# Patient Record
Sex: MALE | Race: WHITE | Employment: FULL TIME | URBAN - METROPOLITAN AREA
[De-identification: names, ages, dates, MRNs, and addresses within clinical notes are randomized per-mention and may not be internally consistent; named-entity substitution may affect disease eponyms.]

---

## 2017-03-02 ENCOUNTER — OFFICE VISIT (OUTPATIENT)
Dept: FAMILY MEDICINE CLINIC | Age: 47
End: 2017-03-02

## 2017-03-02 ENCOUNTER — HOSPITAL ENCOUNTER (OUTPATIENT)
Dept: LAB | Age: 47
Discharge: HOME | End: 2017-03-02
Payer: COMMERCIAL

## 2017-03-02 VITALS
RESPIRATION RATE: 18 BRPM | TEMPERATURE: 97.6 F | DIASTOLIC BLOOD PRESSURE: 86 MMHG | BODY MASS INDEX: 27.83 KG/M2 | HEART RATE: 77 BPM | SYSTOLIC BLOOD PRESSURE: 128 MMHG | HEIGHT: 73 IN | OXYGEN SATURATION: 97 % | WEIGHT: 210 LBS

## 2017-03-02 DIAGNOSIS — Z91.09 ENVIRONMENTAL ALLERGIES: ICD-10-CM

## 2017-03-02 DIAGNOSIS — L21.9 SEBORRHEIC DERMATITIS: ICD-10-CM

## 2017-03-02 DIAGNOSIS — Z86.39 HISTORY OF HYPERLIPIDEMIA: ICD-10-CM

## 2017-03-02 DIAGNOSIS — R13.10 DYSPHAGIA, UNSPECIFIED TYPE: Primary | ICD-10-CM

## 2017-03-02 DIAGNOSIS — K21.9 GASTROESOPHAGEAL REFLUX DISEASE, ESOPHAGITIS PRESENCE NOT SPECIFIED: ICD-10-CM

## 2017-03-02 LAB
ALBUMIN SERPL BCP-MCNC: 4.1 G/DL (ref 3.4–5)
ALBUMIN/GLOB SERPL: 1.2 {RATIO} (ref 0.8–1.7)
ALP SERPL-CCNC: 82 U/L (ref 45–117)
ALT SERPL-CCNC: 58 U/L (ref 16–61)
ANION GAP BLD CALC-SCNC: 7 MMOL/L (ref 3–18)
AST SERPL W P-5'-P-CCNC: 32 U/L (ref 15–37)
BILIRUB SERPL-MCNC: 0.4 MG/DL (ref 0.2–1)
BUN SERPL-MCNC: 18 MG/DL (ref 7–18)
BUN/CREAT SERPL: 17 (ref 12–20)
CALCIUM SERPL-MCNC: 9 MG/DL (ref 8.5–10.1)
CHLORIDE SERPL-SCNC: 106 MMOL/L (ref 100–108)
CHOLEST SERPL-MCNC: 253 MG/DL
CO2 SERPL-SCNC: 27 MMOL/L (ref 21–32)
CREAT SERPL-MCNC: 1.07 MG/DL (ref 0.6–1.3)
GLOBULIN SER CALC-MCNC: 3.3 G/DL (ref 2–4)
GLUCOSE SERPL-MCNC: 94 MG/DL (ref 74–99)
HDLC SERPL-MCNC: 34 MG/DL (ref 40–60)
HDLC SERPL: 7.4 {RATIO} (ref 0–5)
LDLC SERPL CALC-MCNC: 168.2 MG/DL (ref 0–100)
LIPID PROFILE,FLP: ABNORMAL
POTASSIUM SERPL-SCNC: 4.3 MMOL/L (ref 3.5–5.5)
PROT SERPL-MCNC: 7.4 G/DL (ref 6.4–8.2)
SODIUM SERPL-SCNC: 140 MMOL/L (ref 136–145)
TRIGL SERPL-MCNC: 254 MG/DL (ref ?–150)
VLDLC SERPL CALC-MCNC: 50.8 MG/DL

## 2017-03-02 PROCEDURE — 80053 COMPREHEN METABOLIC PANEL: CPT | Performed by: FAMILY MEDICINE

## 2017-03-02 PROCEDURE — 80061 LIPID PANEL: CPT | Performed by: FAMILY MEDICINE

## 2017-03-02 RX ORDER — TACROLIMUS 1 MG/G
OINTMENT TOPICAL 2 TIMES DAILY
Qty: 60 G | Refills: 0 | Status: SHIPPED | OUTPATIENT
Start: 2017-03-02 | End: 2018-01-02 | Stop reason: SDUPTHER

## 2017-03-02 RX ORDER — OMEPRAZOLE 20 MG/1
CAPSULE, DELAYED RELEASE ORAL
Qty: 90 CAP | Refills: 1 | Status: SHIPPED | OUTPATIENT
Start: 2017-03-02 | End: 2017-11-19 | Stop reason: SDUPTHER

## 2017-03-02 NOTE — PROGRESS NOTES
Natividad Sharma is a 55 y.o. male here to talk about possible Heredia's esophagus, family hx of it, hx of GERD, on Omeprazole.

## 2017-03-02 NOTE — PROGRESS NOTES
GERD        HPI: Scar Duron is a 55 y.o. male [place]   Here with history of GERD he had previously been using prilosec intermittently. Has to use prilosec daily now to alleviate symptoms of heartburn. Over the last 2 weeks he has had feeling of food getting stuck in esophagus requiring that he wash it down with water. His mother had hx of Heredia's esophagus. Denies any weight loss. Denies any black colored stools. He has had some blood in stools but thinks this is due to anal fissuring that he has. He does have history of hyperlipidemia but has not had lipids checked in a few years was previously on lovastatin for this problem. He is starting to engage in regular exercise program with his wife. Past Medical History:   Diagnosis Date    Contact dermatitis and other eczema, due to unspecified cause     GERD (gastroesophageal reflux disease)     Hypercholesterolemia     Testicular pain        Current Outpatient Prescriptions   Medication Sig    tacrolimus (PROTOPIC) 0.1 % ointment Apply  to affected area two (2) times a day.  omeprazole (PRILOSEC) 20 mg capsule BID for 2 weeks, then 1 daily for a week then discontinue    ketoconazole (NIZORAL) 2 % topical cream Apply  to affected area two (2) times a day. No current facility-administered medications for this visit. No Known Allergies    Past Surgical History:   Procedure Laterality Date    HX WISDOM TEETH EXTRACTION         Family History   Problem Relation Age of Onset    COPD Mother     Cancer Maternal Aunt        Social History     Social History    Marital status:      Spouse name: N/A    Number of children: N/A    Years of education: N/A     Occupational History    Not on file.      Social History Main Topics    Smoking status: Former Smoker     Packs/day: 0.50     Years: 0.50     Types: Cigarettes     Quit date: 1/1/1996    Smokeless tobacco: Former User     Quit date: 9/3/2012    Alcohol use 0.0 oz/week 0 Standard drinks or equivalent per week      Comment: occasionally    Drug use: No    Sexual activity: Not on file     Other Topics Concern    Not on file     Social History Narrative       Gen- No weight loss, No Malaise, No fatigue  Eyes- No dipoplia, blurry vision  Nose- Chronic nasal congestion, postnasal drip  CVS- No Chest pain, no palpitations, no edema  Resp- No Cough, SOB, LIMON, Wheezing  Neuro- No headaches  Skin- No easy bruising, No rashes        Visit Vitals    /86 (BP 1 Location: Right arm, BP Patient Position: Sitting)    Pulse 77    Temp 97.6 °F (36.4 °C) (Oral)    Resp 18    Ht 6' 1\" (1.854 m)    Wt 210 lb (95.3 kg)    SpO2 97%    BMI 27.71 kg/m2       Physical Exam   Constitutional: He is oriented to person, place, and time. He appears well-developed and well-nourished. No distress. HENT:   Head: Normocephalic and atraumatic. Right Ear: External ear normal.   Left Ear: External ear normal.   Nose: Mucosal edema and rhinorrhea present. Mouth/Throat: Posterior oropharyngeal erythema (mild) present. Excess mucus in posterior oropharynx     Cardiovascular: Normal rate, regular rhythm and normal heart sounds. No murmur heard. Pulmonary/Chest: Effort normal and breath sounds normal. No respiratory distress. He has no wheezes. He has no rales. Abdominal: Soft. Bowel sounds are normal. He exhibits no distension and no mass. There is no tenderness. There is no rebound and no guarding. Neurological: He is alert and oriented to person, place, and time. No cranial nerve deficit. Gait normal.   Skin: Skin is warm and dry. He is not diaphoretic. Psychiatric: He has a normal mood and affect. Nursing note and vitals reviewed. Assesment:  1.  Dysphagia, unspecified type    - REFERRAL TO GASTROENTEROLOGY    2. Gastroesophageal reflux disease, esophagitis presence not specified    - omeprazole (PRILOSEC) 20 mg capsule; BID for 2 weeks, then 1 daily for a week then discontinue  Dispense: 90 Cap; Refill: 1  - REFERRAL TO GASTROENTEROLOGY    3. History of hyperlipidemia    - METABOLIC PANEL, COMPREHENSIVE; Future  - LIPID PANEL; Future    4. Seborrheic dermatitis    - tacrolimus (PROTOPIC) 0.1 % ointment; Apply  to affected area two (2) times a day. Dispense: 60 g; Refill: 0    5. Environmental allergies  Recommended daily antihistamine use      I have discussed the diagnosis with the patient and the intended management  The patient has received an after-visit summary and questions were answered concerning future plans. I have discussed medication usage, side effects and warnings with the patient as well. I have reviewed the plan of care with the patient, accepted their input and they are in agreement with the treatment goals. Follow-up Disposition:  Return in about 1 year (around 3/2/2018) for pending labs.       Carmen Soliman MD                .

## 2017-03-03 PROBLEM — L21.9 SEBORRHEIC DERMATITIS: Status: ACTIVE | Noted: 2017-03-03

## 2017-03-03 PROBLEM — K21.9 GASTROESOPHAGEAL REFLUX DISEASE: Status: ACTIVE | Noted: 2017-03-03

## 2017-03-06 ENCOUNTER — TELEPHONE (OUTPATIENT)
Dept: FAMILY MEDICINE CLINIC | Age: 47
End: 2017-03-06

## 2017-03-09 ENCOUNTER — OFFICE VISIT (OUTPATIENT)
Dept: FAMILY MEDICINE CLINIC | Age: 47
End: 2017-03-09

## 2017-03-09 VITALS
HEART RATE: 65 BPM | TEMPERATURE: 97.3 F | HEIGHT: 73 IN | RESPIRATION RATE: 18 BRPM | DIASTOLIC BLOOD PRESSURE: 84 MMHG | BODY MASS INDEX: 27.7 KG/M2 | WEIGHT: 209 LBS | OXYGEN SATURATION: 97 % | SYSTOLIC BLOOD PRESSURE: 127 MMHG

## 2017-03-09 DIAGNOSIS — E78.00 PURE HYPERCHOLESTEROLEMIA: Primary | ICD-10-CM

## 2017-03-09 NOTE — PATIENT INSTRUCTIONS
Please engage in regular exercise. Moderate intensity aerobic exercise  (60-70% maximal heart rate) for at least 150 minutes per week spread over a minimum of 3 days. Do not take more than 2 days off from exercising. Activities such as brisk walking, jogging, bicycling and swimming. Please also engage in weight resistance exercises for at least 20 minutes twice a week. Also a high fiber diet of 30 grams daily. Hyperlipidemia: After Your Visit  Your Care Instructions  Hyperlipidemia is too much fat in your blood. The body has several kinds of fat, including cholesterol and triglycerides. Your body needs fat for many things, such as making new cells. But too much fat in your blood increases your chances of having a heart attack or stroke. You may be able to lower your cholesterol and triglycerides with a heart-healthy diet, exercise, and if needed, medicine. Your doctor may want you to try lifestyle changes first to see whether they lower the fat in your blood. You may need to take medicine if lifestyle changes do not lower the fat in your blood enough. Follow-up care is a key part of your treatment and safety. Be sure to make and go to all appointments, and call your doctor if you are having problems. Its also a good idea to know your test results and keep a list of the medicines you take. How can you care for yourself at home? Take your medicines  · Take your medicines exactly as prescribed. Call your doctor if you think you are having a problem with your medicine. · If you take medicine to lower your cholesterol, go to follow-up visits. You will need to have blood tests. · Do not take large doses of niacin, which is a B vitamin, while taking medicine called statins. It may increase the chance of muscle pain and liver problems. · Talk to your doctor about avoiding grapefruit juice if you are taking statins. Grapefruit juice can raise the level of this medicine in your blood.  This could increase side effects. Eat more fruits, vegetables, and fiber  · Fruits and vegetables have lots of nutrients that help protect against heart disease, and they have little--if any--fat. Try to eat at least five servings a day. Dark green, deep orange, or yellow fruits and vegetables are healthy choices. · Keep carrots, celery, and other veggies handy for snacks. Buy fruit that is in season and store it where you can see it so that you will be tempted to eat it. Cook dishes that have a lot of veggies in them, such as stir-fries and soups. · Foods high in fiber may reduce your cholesterol and provide important vitamins and minerals. High-fiber foods include whole-grain cereals and breads, oatmeal, beans, brown rice, citrus fruits, and apples. · Buy whole-grain breads and cereals instead of white bread and pastries. Limit saturated fat  · Read food labels and try to avoid saturated fat and trans fat. They increase your risk of heart disease. · Use olive or canola oil when you cook. Try cholesterol-lowering spreads, such as Benecol or Take Control. · Bake, broil, grill, or steam foods instead of frying them. · Limit the amount of high-fat meats you eat, including hot dogs and sausages. Cut out all visible fat when you prepare meat. · Eat fish, skinless poultry, and soy products such as tofu instead of high-fat meats. Soybeans may be especially good for your heart. Eat at least two servings of fish a week. Certain fish, such as salmon, contain omega-3 fatty acids, which may help reduce your risk of heart attack. · Choose low-fat or fat-free milk and dairy products. Get exercise, limit alcohol, and quit smoking  · Get more exercise. Work with your doctor to set up an exercise program. Even if you can do only a small amount, exercise will help you get stronger, have more energy, and manage your weight and your stress. Walking is an easy way to get exercise. Gradually increase the amount you walk every day.  Aim for at least 30 minutes on most days of the week. You also may want to swim, bike, or do other activities. · Limit alcohol to no more than 2 drinks a day for men and 1 drink a day for women. · Do not smoke. If you need help quitting, talk to your doctor about stop-smoking programs and medicines. These can increase your chances of quitting for good. When should you call for help? Call 911 anytime you think you may need emergency care. For example, call if:  · You have symptoms of a heart attack. These may include:  ¨ Chest pain or pressure, or a strange feeling in the chest.  ¨ Sweating. ¨ Shortness of breath. ¨ Nausea or vomiting. ¨ Pain, pressure, or a strange feeling in the back, neck, jaw, or upper belly or in one or both shoulders or arms. ¨ Lightheadedness or sudden weakness. ¨ A fast or irregular heartbeat. After you call 911, the  may tell you to chew 1 adult-strength or 2 to 4 low-dose aspirin. Wait for an ambulance. Do not try to drive yourself. · You have signs of a stroke. These may include:  ¨ Sudden numbness, paralysis, or weakness in your face, arm, or leg, especially on only one side of your body. ¨ New problems with walking or balance. ¨ Sudden vision changes. ¨ Drooling or slurred speech. ¨ New problems speaking or understanding simple statements, or feeling confused. ¨ A sudden, severe headache that is different from past headaches. · You passed out (lost consciousness). Call your doctor now or seek immediate medical care if:  · You have muscle pain or weakness. Watch closely for changes in your health, and be sure to contact your doctor if:  · You are very tired. · You have an upset stomach, gas, constipation, or belly pain or cramps. Where can you learn more? Go to Fanzter.be  Enter C406 in the search box to learn more about \"Hyperlipidemia: After Your Visit. \"   © 9321-2033 [employer].  Care instructions adapted under license by [employer] (which disclaims liability or warranty for this information). This care instruction is for use with your licensed healthcare professional. If you have questions about a medical condition or this instruction, always ask your healthcare professional. [address] any warranty or liability for your use of this information.   Content Version: 7.7.520054; Last Revised: October 13, 2011

## 2017-03-09 NOTE — PROGRESS NOTES
Cholesterol Problem        HPI: Jayshree Salomon is a 55 y.o. male [place]   Here in follow up of his recent lab testing. Lipids are elevated and his 10 year cardiac risk is 5.3% based on current risks. He has been elevated lipids. He has no history CAD, DM or HTN. No significant family hx of CAD. Past Medical History:   Diagnosis Date    Contact dermatitis and other eczema, due to unspecified cause     GERD (gastroesophageal reflux disease)     Hypercholesterolemia     Testicular pain        Current Outpatient Prescriptions   Medication Sig    omeprazole (PRILOSEC) 20 mg capsule BID for 2 weeks, then 1 daily for a week then discontinue    tacrolimus (PROTOPIC) 0.1 % ointment Apply  to affected area two (2) times a day.  ketoconazole (NIZORAL) 2 % topical cream Apply  to affected area two (2) times a day. No current facility-administered medications for this visit. No Known Allergies    Past Surgical History:   Procedure Laterality Date    HX WISDOM TEETH EXTRACTION         Family History   Problem Relation Age of Onset    COPD Mother     Cancer Maternal Aunt        Social History     Social History    Marital status:      Spouse name: N/A    Number of children: N/A    Years of education: N/A     Occupational History    Not on file.      Social History Main Topics    Smoking status: Former Smoker     Packs/day: 0.50     Years: 0.50     Types: Cigarettes     Quit date: 1/1/1996    Smokeless tobacco: Former User     Quit date: 9/3/2012    Alcohol use 0.0 oz/week     0 Standard drinks or equivalent per week      Comment: occasionally    Drug use: No    Sexual activity: Not on file     Other Topics Concern    Not on file     Social History Narrative       ROS    Visit Vitals    /84 (BP 1 Location: Right arm, BP Patient Position: Sitting)    Pulse 65    Temp 97.3 °F (36.3 °C) (Oral)    Resp 18    Ht 6' 1\" (1.854 m)    Wt 209 lb (94.8 kg)    SpO2 97%    BMI 27.57 kg/m2       Physical Exam   Constitutional: He appears well-developed and well-nourished. No distress. HENT:   Head: Normocephalic and atraumatic. Right Ear: External ear normal.   Left Ear: External ear normal.   Skin: He is not diaphoretic. Psychiatric: He has a normal mood and affect. Vitals reviewed. Lab Results   Component Value Date/Time    Cholesterol, total 253 03/02/2017 10:25 AM    HDL Cholesterol 34 03/02/2017 10:25 AM    LDL, calculated 168.2 03/02/2017 10:25 AM    VLDL, calculated 50.8 03/02/2017 10:25 AM    Triglyceride 254 03/02/2017 10:25 AM    CHOL/HDL Ratio 7.4 03/02/2017 10:25 AM           Assesment:  1. Pure hypercholesterolemia  Discussed current risk stratification. Will trial diet and exercise. Recheck in 3 months  - LIPID PANEL; Future        I have discussed the diagnosis with the patient and the intended management  The patient has received an after-visit summary and questions were answered concerning future plans. I have discussed medication usage, side effects and warnings with the patient as well. I have reviewed the plan of care with the patient, accepted their input and they are in agreement with the treatment goals. Follow-up Disposition:  Return in about 3 months (around 6/9/2017) for nurse visit for fasting labs.       Sarabjit Moser MD                .

## 2017-11-19 DIAGNOSIS — K21.9 GASTROESOPHAGEAL REFLUX DISEASE, ESOPHAGITIS PRESENCE NOT SPECIFIED: ICD-10-CM

## 2017-11-20 RX ORDER — OMEPRAZOLE 20 MG/1
CAPSULE, DELAYED RELEASE ORAL
Qty: 90 CAP | Refills: 1 | Status: SHIPPED | OUTPATIENT
Start: 2017-11-20 | End: 2018-01-02 | Stop reason: SDUPTHER

## 2018-01-02 ENCOUNTER — OFFICE VISIT (OUTPATIENT)
Dept: FAMILY MEDICINE CLINIC | Age: 48
End: 2018-01-02

## 2018-01-02 VITALS
OXYGEN SATURATION: 98 % | RESPIRATION RATE: 18 BRPM | HEIGHT: 73 IN | WEIGHT: 214 LBS | BODY MASS INDEX: 28.36 KG/M2 | TEMPERATURE: 98.1 F | DIASTOLIC BLOOD PRESSURE: 85 MMHG | SYSTOLIC BLOOD PRESSURE: 122 MMHG

## 2018-01-02 DIAGNOSIS — K21.9 GASTROESOPHAGEAL REFLUX DISEASE, ESOPHAGITIS PRESENCE NOT SPECIFIED: ICD-10-CM

## 2018-01-02 DIAGNOSIS — L21.9 SEBORRHEIC DERMATITIS: ICD-10-CM

## 2018-01-02 DIAGNOSIS — S69.91XA HAND INJURY, RIGHT, INITIAL ENCOUNTER: Primary | ICD-10-CM

## 2018-01-02 DIAGNOSIS — M25.441 SWELLING OF HAND JOINT, RIGHT: ICD-10-CM

## 2018-01-02 RX ORDER — TACROLIMUS 1 MG/G
OINTMENT TOPICAL 2 TIMES DAILY
Qty: 60 G | Refills: 1 | Status: SHIPPED | OUTPATIENT
Start: 2018-01-02 | End: 2019-10-07

## 2018-01-02 RX ORDER — OMEPRAZOLE 20 MG/1
CAPSULE, DELAYED RELEASE ORAL
Qty: 90 CAP | Refills: 1 | Status: SHIPPED | OUTPATIENT
Start: 2018-01-02 | End: 2022-02-15 | Stop reason: ALTCHOICE

## 2018-01-02 RX ORDER — FLUTICASONE PROPIONATE 220 UG/1
AEROSOL, METERED RESPIRATORY (INHALATION)
COMMUNITY
End: 2019-09-04

## 2018-01-02 NOTE — PROGRESS NOTES
Priyanka Tristan is a 52 y.o. male here this afternoon with right hand injury that he got 2 weeks ago. He hit a chair during an [employer] game. He didn't go anywhere to have it checked. Hurts when he flexes and there is swelling around his top knuckle of his right hand. Learning assessment previously completed. 1. Have you been to the ER, urgent care clinic or hospitalized since your last visit? no    2. Have you seen or consulted any other health care providers outside of the [address] [place] [address] since your last visit (Include any pap smears or colon screening)? Endoscopy at gastro office. Do you have an Advanced Directive? yes    Would you like information on Advanced Directives?  no

## 2018-01-02 NOTE — PROGRESS NOTES
HISTORY OF PRESENT ILLNESS  Harsh Collins is a 52 y.o. male. HPI  Harsh Collins is a 52 y.o. male who presents to the office today for right hand pain. He comes in today with c/o right hand pain. About 2 weeks ago he punched a wooden chair while watching a football game on TV. He did not have any swelling or bruising. There was pain and he initially took Aleve. He never had this evaluated. He comes in today because he has continued swelling around his right 3rd digit knuckle. He is not doing compression, ice or elevation. Chief Complaint   Patient presents with    Hand Injury     right hand       No current outpatient prescriptions on file prior to visit. No current facility-administered medications on file prior to visit. No Known Allergies  Past Medical History:   Diagnosis Date    Contact dermatitis and other eczema, due to unspecified cause     GERD (gastroesophageal reflux disease)     Hypercholesterolemia     Testicular pain      History   Smoking Status    Former Smoker    Packs/day: 0.50    Years: 0.50    Types: Cigarettes    Quit date: 1/1/1996   Smokeless Tobacco    Former User    Quit date: 9/3/2012     History   Alcohol Use    0.0 oz/week    0 Standard drinks or equivalent per week     Comment: occasionally     Family History   Problem Relation Age of Onset    COPD Mother     Cancer Maternal Aunt      Review of Systems   Musculoskeletal: Positive for joint pain. Right hand, 3rd knuckle   Neurological: Negative for tingling and focal weakness. Visit Vitals    /85 (BP 1 Location: Left arm, BP Patient Position: Sitting)    Temp 98.1 °F (36.7 °C) (Oral)    Resp 18    Ht 6' 1\" (1.854 m)    Wt 214 lb (97.1 kg)    SpO2 98%    BMI 28.23 kg/m2     Physical Exam   Constitutional: He is oriented to person, place, and time. He appears well-developed and well-nourished. No distress.    Cardiovascular:   Pulses:       Radial pulses are 2+ on the right side Musculoskeletal:        Right hand: He exhibits bony tenderness and swelling. Normal sensation noted. Normal strength noted. Hands:  Can make a closed fist without difficulty. Neurological: He is alert and oriented to person, place, and time. Psychiatric: He has a normal mood and affect. His behavior is normal.   Nursing note and vitals reviewed. ASSESSMENT and PLAN    ICD-10-CM ICD-9-CM    1. Hand injury, right, initial encounter S69.91XA 959.4       XR HAND RT MIN 3 V   2. Swelling of hand joint, right M25.441 719.04       XR HAND RT MIN 3 V   3. Gastroesophageal reflux disease, esophagitis presence not specified K21.9 530.81 omeprazole (PRILOSEC) 20 mg capsule   4. Seborrheic dermatitis L21.9 690.10 tacrolimus (PROTOPIC) 0.1 % ointment      Xray reviewed in the office and did not show obvious fractures of right hand. Will call with final reading once available. He needs to keep his hand elevated, apply ice every 20 minutes, and take NSAIDs for pain and swelling. Reviewed medication and side effects. Patient agrees with the plan and verbalizes understanding. Follow-up Disposition:  Return if symptoms worsen or fail to improve.     Forestine Sicard, PA-C  1/2/2018

## 2018-01-02 NOTE — PATIENT INSTRUCTIONS
Hand Pain: Care Instructions  Your Care Instructions    Common causes of hand pain are overuse and injuries, such as might happen during sports or home repair projects. Everyday wear and tear, especially as you get older, also can cause hand pain. Most minor hand injuries will heal on their own, and home treatment is usually all you need to do. If you have sudden and severe pain, you may need tests and treatment. Follow-up care is a key part of your treatment and safety. Be sure to make and go to all appointments, and call your doctor if you are having problems. It's also a good idea to know your test results and keep a list of the medicines you take. How can you care for yourself at home? · Take pain medicines exactly as directed. ¨ If the doctor gave you a prescription medicine for pain, take it as prescribed. ¨ If you are not taking a prescription pain medicine, ask your doctor if you can take an over-the-counter medicine. · Rest and protect your hand. Take a break from any activity that may cause pain. · Put ice or a cold pack on your hand for 10 to 20 minutes at a time. Put a thin cloth between the ice and your skin. · Prop up the sore hand on a pillow when you ice it or anytime you sit or lie down during the next 3 days. Try to keep it above the level of your heart. This will help reduce swelling. · If your doctor recommends a sling, splint, or elastic bandage to support your hand, wear it as directed. When should you call for help? Call 911 anytime you think you may need emergency care. For example, call if:  ? · Your hand turns cool or pale or changes color. ?Call your doctor now or seek immediate medical care if:  ? · You cannot move your hand. ? · Your hand pops, moves out of its normal position, and then returns to its normal position. ? · You have signs of infection, such as:  ¨ Increased pain, swelling, warmth, or redness. ¨ Red streaks leading from the sore area.   ¨ Pus draining from a place on your hand. ¨ A fever. ? · Your hand feels numb or tingly. ? Watch closely for changes in your health, and be sure to contact your doctor if:  ? · Your hand feels unstable when you try to use it. ? · You do not get better as expected. ? · You have any new symptoms, such as swelling. ? · Bruises from an injury to your hand last longer than 2 weeks. Where can you learn more? Go to http://geetha-shiraz.info/. Enter R273 in the search box to learn more about \"Hand Pain: Care Instructions. \"  Current as of: March 20, 2017  Content Version: 11.4  © 2603-1748 [employer]. Care instructions adapted under license by [employer] (which disclaims liability or warranty for this information). If you have questions about a medical condition or this instruction, always ask your healthcare professional. [address] any warranty or liability for your use of this information.

## 2018-11-15 ENCOUNTER — HOSPITAL ENCOUNTER (OUTPATIENT)
Dept: LAB | Age: 48
Discharge: HOME | End: 2018-11-15
Payer: COMMERCIAL

## 2018-11-15 ENCOUNTER — OFFICE VISIT (OUTPATIENT)
Dept: FAMILY MEDICINE CLINIC | Age: 48
End: 2018-11-15

## 2018-11-15 VITALS
SYSTOLIC BLOOD PRESSURE: 136 MMHG | HEART RATE: 71 BPM | RESPIRATION RATE: 18 BRPM | TEMPERATURE: 98.4 F | WEIGHT: 217.4 LBS | HEIGHT: 73 IN | OXYGEN SATURATION: 97 % | DIASTOLIC BLOOD PRESSURE: 90 MMHG | BODY MASS INDEX: 28.81 KG/M2

## 2018-11-15 DIAGNOSIS — R41.3 MEMORY DEFICITS: ICD-10-CM

## 2018-11-15 DIAGNOSIS — Z00.00 ANNUAL PHYSICAL EXAM: Primary | ICD-10-CM

## 2018-11-15 DIAGNOSIS — E78.00 PURE HYPERCHOLESTEROLEMIA: ICD-10-CM

## 2018-11-15 DIAGNOSIS — Z00.00 ANNUAL PHYSICAL EXAM: ICD-10-CM

## 2018-11-15 LAB
ALBUMIN SERPL-MCNC: 4.4 G/DL (ref 3.4–5)
ALBUMIN/GLOB SERPL: 1.4 {RATIO} (ref 0.8–1.7)
ALP SERPL-CCNC: 78 U/L (ref 45–117)
ALT SERPL-CCNC: 83 U/L (ref 16–61)
ANION GAP SERPL CALC-SCNC: 9 MMOL/L (ref 3–18)
AST SERPL-CCNC: 42 U/L (ref 15–37)
BILIRUB SERPL-MCNC: 0.5 MG/DL (ref 0.2–1)
BUN SERPL-MCNC: 15 MG/DL (ref 7–18)
BUN/CREAT SERPL: 14 (ref 12–20)
CALCIUM SERPL-MCNC: 9 MG/DL (ref 8.5–10.1)
CHLORIDE SERPL-SCNC: 105 MMOL/L (ref 100–108)
CHOLEST SERPL-MCNC: 273 MG/DL
CO2 SERPL-SCNC: 26 MMOL/L (ref 21–32)
CREAT SERPL-MCNC: 1.06 MG/DL (ref 0.6–1.3)
GLOBULIN SER CALC-MCNC: 3.2 G/DL (ref 2–4)
GLUCOSE SERPL-MCNC: 92 MG/DL (ref 74–99)
HDLC SERPL-MCNC: 36 MG/DL (ref 40–60)
HDLC SERPL: 7.6 {RATIO} (ref 0–5)
LDLC SERPL CALC-MCNC: 174.8 MG/DL (ref 0–100)
LIPID PROFILE,FLP: ABNORMAL
POTASSIUM SERPL-SCNC: 5.1 MMOL/L (ref 3.5–5.5)
PROT SERPL-MCNC: 7.6 G/DL (ref 6.4–8.2)
SODIUM SERPL-SCNC: 140 MMOL/L (ref 136–145)
TRIGL SERPL-MCNC: 311 MG/DL (ref ?–150)
VLDLC SERPL CALC-MCNC: 62.2 MG/DL

## 2018-11-15 PROCEDURE — 80053 COMPREHEN METABOLIC PANEL: CPT

## 2018-11-15 PROCEDURE — 80061 LIPID PANEL: CPT

## 2018-11-15 NOTE — PROGRESS NOTES
Well Male        HPI: Corbin Guido is a 50 y.o. male WHITE OR   Here for annual exam.      He has been Dr. Lonnie Otero for treatment of esophageal stricture. Has had recent dilation. He reports he had a biopsy that was negative however for Heredia's esophagus. His main concern today is that over the last several months to year. He feels he has had difficulty with higher level cognition. 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 He has been having a hard time understanding, processing the information that has been given as well as how to present that information effectively in his safety presentations. This is concerning for him as this is a relatively new issue. He has had a career of having to give large presentations which have never been an issue in the past.  This is a new job with [employer]. He does feel that he is more forgetful than he used to be. Has to read things several times in order to remember it. He has not had any red flag symptoms of forgetting his address, leaving the stove on, forgetting of once names. But he does forget people's names who he has known for a significant amount of time at work which he finds unusual.  He reports he is sleeping well. He denies any depressive symptoms. Past Medical History:   Diagnosis Date    Contact dermatitis and other eczema, due to unspecified cause     GERD (gastroesophageal reflux disease)     Hypercholesterolemia     Testicular pain        Current Outpatient Medications   Medication Sig    omeprazole (PRILOSEC) 20 mg capsule take 1 capsule by mouth twice a day for 2 WEEKS THEN DAILY FOR A WEEK THEN DISCONTINUE (Patient taking differently: 20 mg daily. take 1 capsule by mouth twice a day for 2 WEEKS THEN DAILY FOR A WEEK THEN DISCONTINUE)    fluticasone (FLOVENT HFA) 220 mcg/actuation inhaler Take  by inhalation.  tacrolimus (PROTOPIC) 0.1 % ointment Apply  to affected area two (2) times a day.      No current facility-administered medications for this visit. No Known Allergies    Past Surgical History:   Procedure Laterality Date    HX WISDOM TEETH EXTRACTION         Family History   Problem Relation Age of Onset    COPD Mother     Cancer Maternal Aunt        Social History     Socioeconomic History    Marital status:      Spouse name: Not on file    Number of children: Not on file    Years of education: Not on file    Highest education level: Not on file   Social Needs    Financial resource strain: Not on file    Food insecurity - worry: Not on file    Food insecurity - inability: Not on file   [employer] needs - medical: Not on file   [employer] needs - non-medical: Not on file   Occupational History    Not on file   Tobacco Use    Smoking status: Former Smoker     Packs/day: 0.50     Years: 0.50     Pack years: 0.25     Types: Cigarettes     Last attempt to quit: 1996     Years since quittin.8    Smokeless tobacco: Former User     Quit date: 9/3/2012   Substance and Sexual Activity    Alcohol use: Yes     Alcohol/week: 0.0 oz     Comment: occasionally    Drug use: No    Sexual activity: Not on file   Other Topics Concern    Not on file   Social History Narrative    Not on file       Review of Systems   Constitutional: Negative for chills, fever and weight loss. HENT: Negative for hearing loss, sore throat and tinnitus. Eyes: Negative for blurred vision and double vision. Respiratory: Negative for cough, shortness of breath and wheezing. Cardiovascular: Negative for chest pain, palpitations and claudication. Gastrointestinal: Negative for abdominal pain, blood in stool, melena and nausea. Genitourinary: Negative for dysuria, frequency, hematuria and urgency. Musculoskeletal: Negative for joint pain and myalgias. Skin: Negative for rash. Neurological: Negative for dizziness, seizures and headaches.    Endo/Heme/Allergies: Negative for environmental allergies and polydipsia. Psychiatric/Behavioral: Negative for depression and suicidal ideas. Visit Vitals  /90 (BP 1 Location: Left arm, BP Patient Position: Sitting) Comment: manual cuff   Pulse 71   Temp 98.4 °F (36.9 °C) (Oral)   Resp 18   Ht 6' 1\" (1.854 m)   Wt 217 lb 6.4 oz (98.6 kg)   SpO2 97%   BMI 28.68 kg/m²       Physical Exam   Constitutional: He is oriented to person, place, and time. He appears well-developed and well-nourished. No distress. HENT:   Head: Normocephalic. Right Ear: External ear normal.   Left Ear: External ear normal.   Nose: Nose normal.   Mouth/Throat: Oropharynx is clear and moist.   Eyes: Conjunctivae and EOM are normal. Pupils are equal, round, and reactive to light. Right eye exhibits no discharge. Left eye exhibits no discharge. Neck: Neck supple. No JVD present. No thyromegaly present. Cardiovascular: Normal rate, regular rhythm, normal heart sounds and intact distal pulses. Exam reveals no gallop and no friction rub. No murmur heard. Pulmonary/Chest: Effort normal and breath sounds normal. No respiratory distress. He has no wheezes. He has no rales. Abdominal: Soft. Normal appearance and bowel sounds are normal. He exhibits no distension and no mass. There is no hepatosplenomegaly. There is no tenderness. There is no rebound and no guarding. Genitourinary: Rectum normal, prostate normal, testes normal and penis normal. Rectal exam shows no external hemorrhoid, no internal hemorrhoid and anal tone normal. Prostate is not enlarged and not tender. Musculoskeletal: He exhibits no edema. Lymphadenopathy:        Head (right side): No submental, no submandibular, no preauricular, no posterior auricular and no occipital adenopathy present. Head (left side): No submental, no submandibular, no preauricular, no posterior auricular and no occipital adenopathy present. He has no cervical adenopathy. He has no axillary adenopathy.    Neurological: He is alert and oriented to person, place, and time. No cranial nerve deficit. Coordination and gait normal.   Reflex Scores:       Brachioradialis reflexes are 2+ on the right side and 2+ on the left side. Patellar reflexes are 2+ on the right side and 2+ on the left side. Achilles reflexes are 2+ on the right side and 2+ on the left side. Skin: Skin is warm and dry. No rash noted. Psychiatric: He has a normal mood and affect. His speech is normal and behavior is normal. Judgment and thought content normal.   Nursing note and vitals reviewed. MMSE 27/30    Assesment:    ICD-10-CM ICD-9-CM    1. Annual physical exam U54.16 U20.9 METABOLIC PANEL, COMPREHENSIVE      LIPID PANEL   2. Pure hypercholesterolemia M15.54 321.0 METABOLIC PANEL, COMPREHENSIVE      LIPID PANEL   3. Memory deficits R41.3 780.93        Comprehensive metabolic panel and lipid panel ordered today. As patient does have history of hyperlipidemia. Has previously been on statin medication. Titer find neuropsychologist for further evaluation of the patient did well on Mini-Mental status exam scoring 27/33. But due to this recent issues with his memory I did think neuropsychology evaluation will be helpful at this time. I have discussed the diagnosis with the patient and the intended management  The patient has received an after-visit summary and questions were answered concerning future plans. I have discussed medication usage, side effects and warnings with the patient as well. I have reviewed the plan of care with the patient, accepted their input and they are in agreement with the treatment goals. Follow-up Disposition:  Return in about 1 year (around 11/15/2019) for annual exam pending labs.       Lorri Cedillo MD                .

## 2018-11-15 NOTE — PATIENT INSTRUCTIONS

## 2018-11-19 DIAGNOSIS — E78.00 PURE HYPERCHOLESTEROLEMIA: Primary | ICD-10-CM

## 2018-11-19 RX ORDER — ROSUVASTATIN CALCIUM 10 MG/1
10 TABLET, COATED ORAL
Qty: 90 TAB | Refills: 1 | Status: SHIPPED | OUTPATIENT
Start: 2018-11-19 | End: 2019-08-26 | Stop reason: SDUPTHER

## 2018-11-19 NOTE — PROGRESS NOTES
Spoke with patient. He is agreeable to start new medication and would like it sent to [facility] listed in his chart.

## 2018-11-19 NOTE — PROGRESS NOTES
Michelle has elevated total cholesterol, LDL. ASCVD guidelines at 6.8% 10-year risk of cardiovascular disease. He is agreed to start Crestor 10 mg nightly. This has been sent to the pharmacy [facility].     Recheck labs in 3 months    Colten Mckenzie MD  11/19/2018, 6:04 PM  [facility]

## 2018-11-20 NOTE — PROGRESS NOTES
Spoke with patient and made him aware to return in 3 months for fasting labs. He voiced understanding.

## 2019-08-26 DIAGNOSIS — E78.00 PURE HYPERCHOLESTEROLEMIA: ICD-10-CM

## 2019-08-26 RX ORDER — ROSUVASTATIN CALCIUM 10 MG/1
10 TABLET, COATED ORAL
Qty: 90 TAB | Refills: 0 | Status: SHIPPED | OUTPATIENT
Start: 2019-08-26 | End: 2019-10-07

## 2019-08-26 NOTE — TELEPHONE ENCOUNTER
Future Appointments   Date Time Provider [place]   8/30/2019  8:30 AM [place] Painting PA-C [id number] [facility]   10/7/2019  2:00 PM Leyda Sousa MD [place]

## 2019-09-04 ENCOUNTER — OFFICE VISIT (OUTPATIENT)
Dept: FAMILY MEDICINE CLINIC | Age: 49
End: 2019-09-04

## 2019-09-04 VITALS
SYSTOLIC BLOOD PRESSURE: 131 MMHG | OXYGEN SATURATION: 96 % | HEIGHT: 73 IN | RESPIRATION RATE: 18 BRPM | TEMPERATURE: 99 F | DIASTOLIC BLOOD PRESSURE: 98 MMHG | HEART RATE: 80 BPM | BODY MASS INDEX: 28.23 KG/M2 | WEIGHT: 213 LBS

## 2019-09-04 DIAGNOSIS — K42.9 UMBILICAL HERNIA WITHOUT OBSTRUCTION AND WITHOUT GANGRENE: Primary | ICD-10-CM

## 2019-09-04 NOTE — PATIENT INSTRUCTIONS
Umbilical Hernia: Care Instructions  Overview    An umbilical hernia is a bulge near the belly button, or navel. Intestines or other tissues may bulge through an opening or a weak spot in the stomach muscles. The hernia has a sac that may hold some intestine, fat, or fluid. Many umbilical hernias are caused by pressure near the belly button. Pressure may come from increased weight, repeated straining, or pregnancy. A very small hernia may not cause problems. But your doctor may recommend repairing the muscle. This helps you avoid the risk that the hernia might trap some of the tissues or intestine. This could be an emergency. Follow-up care is a key part of your treatment and safety. Be sure to make and go to all appointments, and call your doctor if you are having problems. It's also a good idea to know your test results and keep a list of the medicines you take. How can you care for yourself at home? · Watch for any signs that the hernia may be causing problems. Your belly may get bigger, and the skin over the hernia may look red. You may have pain or feel bulging or pressure from the hernia. Call your doctor right away if you see these signs. When should you call for help? Call your doctor now or seek immediate medical care if:    · You have new or worse belly pain.     · You vomit.     · You cannot pass stool or gas.     · You can't push the hernia back into place with gentle pressure when you are lying down.     · The area over the hernia turns red or becomes tender.    Watch closely for changes in your health, and be sure to contact your doctor if you have any problems. Current as of: November 7, 2018  Content Version: 12.1  © 0256-3454 [employer]. Care instructions adapted under license by [employer] (which disclaims liability or warranty for this information).  If you have questions about a medical condition or this instruction, always ask your healthcare professional. [employer] disclaims any warranty or liability for your use of this information.

## 2019-09-04 NOTE — PROGRESS NOTES
HISTORY OF PRESENT ILLNESS  Yazmin Chou is a 50 y.o. male. HPI  Yazmin Chou is a 50 y.o. male who presents to the office today for hernia  This is a new patient. His PCP was Dr. Hayley Lin  He comes in with concerns of a umbilical hernia. He says he first noticed this a few years ago. Over the last several months he has noticed it is larger and sticking out more. He is able to reduce it. There is no pain but it has been causing some discomfort. His BMs have changed and are slowing down and he complains of more cramping on his lower abdomen. He is concerned because his father had history of small bowel obstructions, although he has had no personal history of this. Chief Complaint   Patient presents with    Mass     possible hernia       Current Outpatient Medications on File Prior to Visit   Medication Sig Dispense Refill    rosuvastatin (CRESTOR) 10 mg tablet Take 1 Tab by mouth nightly. Indications: excessive fat in the blood 90 Tab 0    omeprazole (PRILOSEC) 20 mg capsule take 1 capsule by mouth twice a day for 2 WEEKS THEN DAILY FOR A WEEK THEN DISCONTINUE (Patient taking differently: 20 mg daily. take 1 capsule by mouth twice a day for 2 WEEKS THEN DAILY FOR A WEEK THEN DISCONTINUE) 90 Cap 1    tacrolimus (PROTOPIC) 0.1 % ointment Apply  to affected area two (2) times a day. 60 g 1     No current facility-administered medications on file prior to visit.       No Known Allergies  Past Medical History:   Diagnosis Date    Contact dermatitis and other eczema, due to unspecified cause     GERD (gastroesophageal reflux disease)     Hypercholesterolemia     Testicular pain      Social History     Tobacco Use   Smoking Status Former Smoker    Packs/day: 0.50    Years: 0.50    Pack years: 0.25    Types: Cigarettes    Last attempt to quit: 1996    Years since quittin.6   Smokeless Tobacco Former User    Quit date: 9/3/2012     Social History     Substance and Sexual Activity Alcohol Use Yes    Alcohol/week: 0.0 standard drinks    Comment: occasionally     Family History   Problem Relation Age of Onset    COPD Mother     Cancer Maternal Aunt        Review of Systems   Constitutional: Negative for fever, malaise/fatigue and weight loss. Gastrointestinal: Positive for abdominal pain. Negative for blood in stool, constipation, diarrhea, nausea and vomiting. Neurological: Negative for dizziness. Visit Vitals  BP (!) 131/98 (BP 1 Location: Right arm, BP Patient Position: Sitting)   Pulse 80   Temp 99 °F (37.2 °C) (Oral)   Resp 18   Ht 6' 1\" (1.854 m)   Wt 213 lb (96.6 kg)   SpO2 96%   BMI 28.10 kg/m²     Physical Exam   Constitutional: He is oriented to person, place, and time. He appears well-developed and well-nourished. No distress. Cardiovascular: Normal rate. Pulmonary/Chest: Effort normal. No respiratory distress. Abdominal: Soft. Normal appearance and bowel sounds are normal. He exhibits no distension. There is no tenderness. There is no guarding. A hernia is present. Neurological: He is alert and oriented to person, place, and time. Skin: Skin is warm and dry. Psychiatric: He has a normal mood and affect. His behavior is normal.   Nursing note and vitals reviewed. ASSESSMENT and PLAN    ICD-10-CM ICD-9-CM    1. Umbilical hernia without obstruction and without gangrene K42.9 553.1 REFERRAL TO GENERAL SURGERY      Referral to general surgery to evaluate hernia. Although it is small, if there is anything that needs to be done, he would like this done soon because there will be a change in health insurance soon. Patient agrees with the plan and verbalizes understanding. Follow-up and Dispositions    · Return if symptoms worsen or fail to improve.        Miranda Gaona PA-C  9/4/2019

## 2019-09-04 NOTE — PROGRESS NOTES
Divine Ruvalcaba is a 50 y.o. male here for possible hernia, notices a bump near belly button a while back, he doesn't have pain but lately has been having some stomach cramps and bowel movements aren't as frequent as they used to be.

## 2019-09-09 ENCOUNTER — TELEPHONE (OUTPATIENT)
Dept: FAMILY MEDICINE CLINIC | Age: 49
End: 2019-09-09

## 2019-09-13 ENCOUNTER — OFFICE VISIT (OUTPATIENT)
Dept: FAMILY MEDICINE CLINIC | Age: 49
End: 2019-09-13

## 2019-09-13 VITALS
RESPIRATION RATE: 16 BRPM | BODY MASS INDEX: 28.1 KG/M2 | TEMPERATURE: 97.2 F | HEART RATE: 72 BPM | DIASTOLIC BLOOD PRESSURE: 88 MMHG | HEIGHT: 73 IN | SYSTOLIC BLOOD PRESSURE: 130 MMHG | WEIGHT: 212 LBS | OXYGEN SATURATION: 97 %

## 2019-09-13 DIAGNOSIS — F34.1 DYSTHYMIA: Primary | ICD-10-CM

## 2019-09-13 RX ORDER — BUPROPION HYDROCHLORIDE 75 MG/1
75 TABLET ORAL 2 TIMES DAILY
Qty: 60 TAB | Refills: 2 | Status: SHIPPED | OUTPATIENT
Start: 2019-09-13 | End: 2019-09-26 | Stop reason: DRUGHIGH

## 2019-09-13 NOTE — PROGRESS NOTES
Sherri Burk is a 50 y.o. male (: 1970) presenting to address:    Chief Complaint   Patient presents with    Depression       Vitals:    19 0840   BP: 130/88   Pulse: 72   Resp: 16   Temp: 97.2 °F (36.2 °C)   TempSrc: Oral   SpO2: 97%   Weight: 212 lb (96.2 kg)   Height: 6' 1\" (1.854 m)   PainSc:   0 - No pain       Hearing/Vision:   No exam data present    Learning Assessment:     Learning Assessment 2019   PRIMARY LEARNER Patient   HIGHEST LEVEL OF EDUCATION - PRIMARY LEARNER  -   BARRIERS PRIMARY LEARNER -   CO-LEARNER CAREGIVER -   PRIMARY LANGUAGE ENGLISH   LEARNER PREFERENCE PRIMARY PICTURES     LISTENING     VIDEOS     DEMONSTRATION     READING   ANSWERED BY patient   RELATIONSHIP SELF     Depression Screening:     3 most recent PHQ Screens 2019   Little interest or pleasure in doing things Not at all   Feeling down, depressed, irritable, or hopeless Nearly every day   Total Score PHQ 2 3   Trouble falling or staying asleep, or sleeping too much Nearly every day   Thoughts of being better off dead, or hurting yourself in some way Not at all     Fall Risk Assessment:     Fall Risk Assessment, last 12 mths 2019   Able to walk? Yes   Fall in past 12 months? No     Abuse Screening:     Abuse Screening Questionnaire 2019   Do you ever feel afraid of your partner? N   Are you in a relationship with someone who physically or mentally threatens you? N   Is it safe for you to go home? Y     Coordination of Care Questionaire:   1. Have you been to the ER, urgent care clinic since your last visit? Hospitalized since your last visit? NO    2. Have you seen or consulted any other health care providers outside of the [address] [place] [address] since your last visit? Include any pap smears or colon screening.  NO

## 2019-09-13 NOTE — PROGRESS NOTES
Socrates Garcia is a 50 y.o. male who presents to the office today for depression. He comes in with concerns of depression. He has a past hx of depression, treated for this about 10-15 years ago. This was situational at the time and he has been doing well since then. Within the last several months his wife has been noticing he is snapping more and angry more. He is having outbursts at work, mainly not tolerating his co-workers. He says work is stressful. His wife was recently laid off and is looking for another job. States he would rather not socialize and just wants to hang out at home. No motivation to do much outside of going to work  Denies S/H thoughts or ideations  Drinks 1-2 beers/night    Chief Complaint   Patient presents with    Depression     Current Outpatient Medications on File Prior to Visit   Medication Sig Dispense Refill    rosuvastatin (CRESTOR) 10 mg tablet Take 1 Tab by mouth nightly. Indications: excessive fat in the blood 90 Tab 0    omeprazole (PRILOSEC) 20 mg capsule take 1 capsule by mouth twice a day for 2 WEEKS THEN DAILY FOR A WEEK THEN DISCONTINUE (Patient taking differently: 20 mg daily. take 1 capsule by mouth twice a day for 2 WEEKS THEN DAILY FOR A WEEK THEN DISCONTINUE) 90 Cap 1    tacrolimus (PROTOPIC) 0.1 % ointment Apply  to affected area two (2) times a day. 60 g 1     No current facility-administered medications on file prior to visit.       No Known Allergies  Past Medical History:   Diagnosis Date    Contact dermatitis and other eczema, due to unspecified cause     GERD (gastroesophageal reflux disease)     Hypercholesterolemia     Testicular pain      Social History     Tobacco Use   Smoking Status Former Smoker    Packs/day: 0.50    Years: 0.50    Pack years: 0.25    Types: Cigarettes    Last attempt to quit: 1996    Years since quittin.7   Smokeless Tobacco Former User    Quit date: 9/3/2012     Social History     Substance and Sexual Activity Alcohol Use Yes    Alcohol/week: 0.0 standard drinks    Comment: occasionally     Family History   Problem Relation Age of Onset    COPD Mother     Cancer Maternal Aunt        Review of Systems   Constitutional: Positive for malaise/fatigue. Negative for diaphoresis. Eyes: Negative for blurred vision. Cardiovascular: Negative for chest pain and palpitations. Gastrointestinal: Negative for abdominal pain, diarrhea, nausea and vomiting. Musculoskeletal: Negative for joint pain and myalgias. Neurological: Negative for dizziness. Endo/Heme/Allergies: Does not bruise/bleed easily. Psychiatric/Behavioral: Positive for depression. Negative for memory loss, substance abuse and suicidal ideas. The patient is not nervous/anxious and does not have insomnia. Visit Vitals  /88 (BP 1 Location: Left arm, BP Patient Position: Sitting)   Pulse 72   Temp 97.2 °F (36.2 °C) (Oral)   Resp 16   Ht 6' 1\" (1.854 m)   Wt 212 lb (96.2 kg)   SpO2 97%   BMI 27.97 kg/m²     Physical Exam   Constitutional: He is oriented to person, place, and time and well-developed, well-nourished, and in no distress. Cardiovascular: Normal rate. Pulmonary/Chest: Effort normal. No respiratory distress. Neurological: He is alert and oriented to person, place, and time. He displays facial symmetry. Gait normal.   Psychiatric: Mood, affect and judgment normal. His mood appears not anxious. He does not express impulsivity. He expresses no homicidal and no suicidal ideation. He expresses no suicidal plans and no homicidal plans. Nursing note and vitals reviewed. Assessment and Plan  1. Dysthymia  Start wellbutrin and given list of therapist. He will start talk therapy as well. We discussed needing to cut back on alcohol and try not to drink a beer every night after work. He contracts for safety  - buPROPion (WELLBUTRIN) 75 mg tablet; Take 1 Tab by mouth two (2) times a day. Dispense: 60 Tab;  Refill: 2    Reviewed medication and side effects. Patient agrees with the plan and verbalizes understanding. I have spent over 25 minutes in face to face time with this pt in discussion with respect to the aforementioned problems, diagnoses and management plans. >50% of the time was spent counseling and coordinating care. Follow-up and Dispositions    · Return in about 6 weeks (around 10/25/2019) for Depression. Darryl Mendoza PA-C  9/22/2019    PLEASE NOTE:  This document has been produced using voice recognition software. Unrecognized errors in transcription may be present.

## 2019-09-22 NOTE — PATIENT INSTRUCTIONS

## 2019-09-24 ENCOUNTER — TELEPHONE (OUTPATIENT)
Dept: FAMILY MEDICINE CLINIC | Age: 49
End: 2019-09-24

## 2019-09-24 NOTE — TELEPHONE ENCOUNTER
Dr Anderson Lopez from [facility] is suggesting to make a dose change to the patients Wellbutrin to increase to 200mg daily.      If you need to have please call     [phone]

## 2019-09-26 DIAGNOSIS — F34.1 DYSTHYMIA: Primary | ICD-10-CM

## 2019-09-26 RX ORDER — BUPROPION HYDROCHLORIDE 100 MG/1
100 TABLET ORAL 2 TIMES DAILY
Qty: 60 TAB | Refills: 1 | Status: SHIPPED | OUTPATIENT
Start: 2019-09-26 | End: 2019-11-18 | Stop reason: SDUPTHER

## 2019-11-24 DIAGNOSIS — E78.00 PURE HYPERCHOLESTEROLEMIA: ICD-10-CM

## 2019-11-27 RX ORDER — ROSUVASTATIN CALCIUM 10 MG/1
TABLET, COATED ORAL
Qty: 90 TAB | Refills: 0 | Status: SHIPPED | OUTPATIENT
Start: 2019-11-27 | End: 2022-02-15 | Stop reason: ALTCHOICE

## 2022-02-15 PROBLEM — K42.9 UMBILICAL HERNIA: Status: ACTIVE | Noted: 2019-10-02

## 2022-03-18 PROBLEM — L21.9 SEBORRHEIC DERMATITIS: Status: ACTIVE | Noted: 2017-03-03

## 2022-03-18 PROBLEM — E78.00 PURE HYPERCHOLESTEROLEMIA: Status: ACTIVE | Noted: 2018-11-19

## 2022-03-19 PROBLEM — K21.9 GASTROESOPHAGEAL REFLUX DISEASE: Status: ACTIVE | Noted: 2017-03-03

## 2022-03-20 PROBLEM — K42.9 UMBILICAL HERNIA: Status: ACTIVE | Noted: 2019-10-02

## 2022-06-02 PROBLEM — L76.82 PAIN AT SURGICAL INCISION: Status: ACTIVE | Noted: 2022-06-02

## 2022-06-06 PROBLEM — M77.12 LATERAL EPICONDYLITIS OF LEFT ELBOW: Status: ACTIVE | Noted: 2022-03-04

## 2023-01-31 RX ORDER — OMEPRAZOLE 40 MG/1
40 CAPSULE, DELAYED RELEASE ORAL DAILY
COMMUNITY

## 2023-01-31 RX ORDER — BUPROPION HYDROCHLORIDE 100 MG/1
TABLET ORAL
COMMUNITY
Start: 2019-11-19

## 2023-01-31 RX ORDER — ROSUVASTATIN CALCIUM 20 MG/1
20 TABLET, COATED ORAL
COMMUNITY

## 2023-01-31 RX ORDER — TAMSULOSIN HYDROCHLORIDE 0.4 MG/1
0.4 CAPSULE ORAL
COMMUNITY
Start: 2022-06-06